# Patient Record
Sex: MALE | Race: WHITE | NOT HISPANIC OR LATINO | URBAN - METROPOLITAN AREA
[De-identification: names, ages, dates, MRNs, and addresses within clinical notes are randomized per-mention and may not be internally consistent; named-entity substitution may affect disease eponyms.]

---

## 2017-04-05 ENCOUNTER — APPOINTMENT (RX ONLY)
Dept: URBAN - METROPOLITAN AREA CLINIC 139 | Facility: CLINIC | Age: 42
Setting detail: DERMATOLOGY
End: 2017-04-05

## 2017-04-05 DIAGNOSIS — B02.9 ZOSTER WITHOUT COMPLICATIONS: ICD-10-CM

## 2017-04-05 PROCEDURE — ? COUNSELING

## 2017-04-05 PROCEDURE — ? PRESCRIPTION

## 2017-04-05 PROCEDURE — ? TZANCK SMEAR

## 2017-04-05 PROCEDURE — ? OTHER

## 2017-04-05 RX ORDER — IBUPROFEN 800 MG/1
TABLET, FILM COATED ORAL
Qty: 42 | Refills: 3 | Status: ERX | COMMUNITY
Start: 2017-04-05

## 2017-04-05 RX ORDER — VALACYCLOVIR HYDROCHLORIDE 1 G/1
TABLET, FILM COATED ORAL TID
Qty: 30 | Refills: 0 | Status: ERX | COMMUNITY
Start: 2017-04-05

## 2017-04-05 RX ADMIN — IBUPROFEN: 800 TABLET, FILM COATED ORAL at 20:03

## 2017-04-05 RX ADMIN — VALACYCLOVIR HYDROCHLORIDE: 1 TABLET, FILM COATED ORAL at 20:06

## 2017-04-05 ASSESSMENT — LOCATION ZONE DERM: LOCATION ZONE: TRUNK

## 2017-04-05 ASSESSMENT — LOCATION SIMPLE DESCRIPTION DERM: LOCATION SIMPLE: RIGHT BUTTOCK

## 2017-04-05 ASSESSMENT — LOCATION DETAILED DESCRIPTION DERM: LOCATION DETAILED: RIGHT BUTTOCK

## 2017-04-05 NOTE — HPI: RASH
How Severe Is Your Rash?: mild
Is This A New Presentation, Or A Follow-Up?: Rash
Additional History: Patient believes he may have shingles

## 2017-04-05 NOTE — PROCEDURE: OTHER
Other (Free Text): Rx for Tylenol 3 called into [facility] in [place] on [address]. Prescription taken by Emily. A quantity of 25 with no refills. Patient is to take 1-2 tabs PO q 4-6 hrs PRN pain. Per Dr. Manriquez no charge for today's visit
Note Text (......Xxx Chief Complaint.): This diagnosis correlates with the
Detail Level: Simple